# Patient Record
(demographics unavailable — no encounter records)

---

## 2024-11-01 NOTE — DISCUSSION/SUMMARY
[de-identified] : The patient was advised of the diagnosis. The natural history of the pathology was explained in full to the patient in layman's terms. The risks and benefits of surgical and non-surgical treatment alternatives were explained in full to the patient. All questions were answered.  Overall doing well. Will cont HEP f/u prn.

## 2024-11-01 NOTE — HISTORY OF PRESENT ILLNESS
[0] : 0 [de-identified] : Patient is here today for a follow up for the right shoulder. She reports improvement since the last visit, stating that she has not experienced any pain since the start of October. She has been going to PT. 80% better mild soreness.  [] : no [FreeTextEntry1] : Right shoulder [de-identified] : Movement [de-identified] : Physical therapy

## 2024-11-01 NOTE — PHYSICAL EXAM
[Right] : right shoulder [5 ___] : forward flexion 5[unfilled]/5 [NL (0-180)] : full active abduction 0-180 degrees [NL (0-70)] : full internal rotation 0-70 degrees [NL (0-90)] : full external rotation 0-90 degrees [5___] : abduction 5[unfilled]/5 [] : no sensory deficits